# Patient Record
Sex: MALE | HISPANIC OR LATINO | Employment: FULL TIME
[De-identification: names, ages, dates, MRNs, and addresses within clinical notes are randomized per-mention and may not be internally consistent; named-entity substitution may affect disease eponyms.]

---

## 2017-06-21 ENCOUNTER — RECORDS - HEALTHEAST (OUTPATIENT)
Dept: ADMINISTRATIVE | Facility: OTHER | Age: 62
End: 2017-06-21

## 2018-01-02 ENCOUNTER — RECORDS - HEALTHEAST (OUTPATIENT)
Dept: ADMINISTRATIVE | Facility: OTHER | Age: 63
End: 2018-01-02

## 2018-02-20 ENCOUNTER — RECORDS - HEALTHEAST (OUTPATIENT)
Dept: ADMINISTRATIVE | Facility: OTHER | Age: 63
End: 2018-02-20

## 2018-06-06 ENCOUNTER — RECORDS - HEALTHEAST (OUTPATIENT)
Dept: ADMINISTRATIVE | Facility: OTHER | Age: 63
End: 2018-06-06

## 2018-06-06 LAB
ALT SERPL W/O P-5'-P-CCNC: 29 IU/L (ref 8–45)
AST SERPL-CCNC: 25 IU/L (ref 2–40)
CREAT SERPL-MCNC: 0.95 MG/DL (ref 0.72–1.25)
GFR ESTIMATE EXT - HISTORICAL: >60 ML/MIN/1.73M2
GFR ESTIMATE, IF BLACK EXT - HISTORICAL: >60 ML/MIN/1.73M2

## 2018-06-12 ENCOUNTER — RECORDS - HEALTHEAST (OUTPATIENT)
Dept: ADMINISTRATIVE | Facility: OTHER | Age: 63
End: 2018-06-12

## 2019-09-12 ENCOUNTER — RECORDS - HEALTHEAST (OUTPATIENT)
Dept: ADMINISTRATIVE | Facility: OTHER | Age: 64
End: 2019-09-12

## 2019-09-23 ENCOUNTER — RECORDS - HEALTHEAST (OUTPATIENT)
Dept: ADMINISTRATIVE | Facility: OTHER | Age: 64
End: 2019-09-23

## 2019-10-22 ENCOUNTER — RECORDS - HEALTHEAST (OUTPATIENT)
Dept: ADMINISTRATIVE | Facility: OTHER | Age: 64
End: 2019-10-22

## 2019-11-07 ENCOUNTER — RECORDS - HEALTHEAST (OUTPATIENT)
Dept: ADMINISTRATIVE | Facility: OTHER | Age: 64
End: 2019-11-07

## 2020-05-04 ENCOUNTER — OFFICE VISIT - HEALTHEAST (OUTPATIENT)
Dept: FAMILY MEDICINE | Facility: CLINIC | Age: 65
End: 2020-05-04

## 2020-05-04 DIAGNOSIS — R79.89 LOW TESTOSTERONE: ICD-10-CM

## 2020-05-04 RX ORDER — TESTOSTERONE CYPIONATE 200 MG/ML
200 INJECTION, SOLUTION INTRAMUSCULAR
Qty: 5 ML | Refills: 1 | Status: SHIPPED | OUTPATIENT
Start: 2020-05-04

## 2020-05-04 RX ORDER — ERGOCALCIFEROL 1.25 MG/1
50000 CAPSULE ORAL
Status: SHIPPED | COMMUNITY
Start: 2019-08-13

## 2020-05-04 RX ORDER — FERROUS SULFATE 325(65) MG
1 TABLET ORAL
Status: SHIPPED | COMMUNITY
Start: 2020-05-04

## 2020-05-05 ENCOUNTER — COMMUNICATION - HEALTHEAST (OUTPATIENT)
Dept: FAMILY MEDICINE | Facility: CLINIC | Age: 65
End: 2020-05-05

## 2021-04-09 ENCOUNTER — RECORDS - HEALTHEAST (OUTPATIENT)
Dept: HEALTH INFORMATION MANAGEMENT | Facility: CLINIC | Age: 66
End: 2021-04-09

## 2021-04-15 ENCOUNTER — AMBULATORY - HEALTHEAST (OUTPATIENT)
Dept: NURSING | Facility: CLINIC | Age: 66
End: 2021-04-15

## 2021-05-06 ENCOUNTER — AMBULATORY - HEALTHEAST (OUTPATIENT)
Dept: NURSING | Facility: CLINIC | Age: 66
End: 2021-05-06

## 2021-06-07 NOTE — PROGRESS NOTES
"Malachi Husain is a 65 y.o. male who is being evaluated via a billable video visit.      The patient has been notified of following:     \"This video visit will be conducted via a call between you and your physician/provider. We have found that certain health care needs can be provided without the need for an in-person physical exam.  This service lets us provide the care you need with a video conversation.  If a prescription is necessary we can send it directly to your pharmacy.  If lab work is needed we can place an order for that and you can then stop by our lab to have the test done at a later time.    Video visits are billed at different rates depending on your insurance coverage. Please reach out to your insurance provider with any questions.    If during the course of the call the physician/provider feels a video visit is not appropriate, you will not be charged for this service.\"    Patient has given verbal consent to a Video visit? Yes    Patient would like to receive their AVS by AVS Preference: Christopher.    Patient would like the video invitation sent by: Text to cell phone: [phone]     Will anyone else be joining your video visit? No        Video Start Time: 2:42    Additional provider notes:  ASSESSMENT/PLAN:       1. Low testosterone  New patient visit  - syringe with needle (BD LUER-SHANNAN SYRINGE) 3 mL 22 x 1 1/2\" Syrg; Use 1 Syringe As Directed every 14 (fourteen) days.  Dispense: 10 each; Refill: 3  - testosterone cypionate (DEPOTESTOTERONE CYPIONATE) 200 mg/mL injection; Inject 1 mL (200 mg total) into the shoulder, thigh, or buttocks every 14 (fourteen) days.  Dispense: 5 mL; Refill: 1    Renewed the testosterone as well as his syringes with needles.  No use of testosterone every 2 weeks.    In 2017 the patient did have a low free testosterone which was normal when rechecked the following year.  He has not had any blood work done recently.    In 6 months when I see him back blood work should " "include a CBC, ferritin, vitamin D level, PSA, lipids, TSH    Balaji Peguero MD      PROGRESS NOTE   5/4/2020    SUBJECTIVE:  Malachi Husain is a 65 y.o. male  who presents for   Chief Complaint   Patient presents with     Medication Refill     needs a refill for testosterone, and needles      The patient's visit today was prompted by him running out of testosterone including his needles and syringes.  He has been getting his care at [facility] but with insurance change [facility] is out of network.  He had a history of hypogonadism that is treated with testosterone 200 mg IM every 2 weeks.  He has not had medicine to use now for a couple shots that have been missed.  He is noticing some increased fatigue.  His legs feel more weak and tired and he has some paresthesias in his legs.    The patient does take a vitamin D supplement monthly as well as an iron supplement on a daily basis.    The patient works at [employer] and is been very busy spending a lot of time on his feet.    There is no problem list on file for this patient.      Current Outpatient Medications   Medication Sig Dispense Refill     ergocalciferol (ERGOCALCIFEROL) 1,250 mcg (50,000 unit) capsule Take 50,000 Units by mouth.       ferrous sulfate 325 (65 FE) MG tablet Take 1 tablet by mouth daily with breakfast.       needle, disp, 22 G 22 gauge x 1 1/2\" Ndle Use As Directed.       testosterone cypionate (DEPOTESTOTERONE CYPIONATE) 200 mg/mL injection Inject 1 mL (200 mg total) into the shoulder, thigh, or buttocks every 14 (fourteen) days. 5 mL 1     syringe with needle (BD LUER-SHANNAN SYRINGE) 3 mL 22 x 1 1/2\" Syrg Use 1 Syringe As Directed every 14 (fourteen) days. 10 each 3     No current facility-administered medications for this visit.        Social History     Tobacco Use   Smoking Status Current Every Day Smoker     Types: Cigarettes   Smokeless Tobacco Never Used           OBJECTIVE:        No results found for this or any previous visit (from the past " 240 hour(s)).    There were no vitals filed for this visit.  No data recorded    Physical Exam:     GENERAL: healthy, alert and no distress  EYES: Eyes grossly normal to inspection, conjunctivae and sclerae normal  RESP: no audible wheeze, cough, or visible cyanosis.  No visible retractions or increased work of breathing.  Able to speak fully in complete sentences.  NEURO: Cranial nerves grossly intact, mentation intact and speech normal  PSYCH: mentation appears normal, affect normal/bright, judgement and insight intact, normal speech and appearance well-groomed      Video-Visit Details    Type of service:  Video Visit    Video End Time (time video stopped): 2:55  Originating Location (pt. Location): Home    Distant Location (provider location):  [facility]     Platform used for Video Visit: Kenneth Peguero MD

## 2021-06-07 NOTE — TELEPHONE ENCOUNTER
Central PA team  [phone]  Pool: HE PA MED ([id number])          PA has been initiated.       PA form completed and faxed insurance via Cover My Meds     Key:  [id number] - PA Case ID: [id number]     Medication:  Testosterone Cypionate 200MG/ML intramuscular solution    Insurance:  [employer]        Response will be received via fax and may take up to 5-10 business days depending on plan

## 2021-06-07 NOTE — TELEPHONE ENCOUNTER
Prior Authorization Request  Who s requesting:  Pharmacy  Pharmacy Name and Location: [id number]  Medication Name: Testosterone Cypionate 200MG/ML  Insurance Plan: [employer] [facility] plan   Insurance Member ID Number:  [id number]  CoverMyMeds Key: N/A  Informed patient that prior authorizations can take up to 10 business days for response:   Yes  Okay to leave a detailed message: No

## 2021-06-16 PROBLEM — E55.9 VITAMIN D DEFICIENCY: Status: ACTIVE | Noted: 2021-04-10

## 2021-06-16 PROBLEM — E29.1 HYPOGONADISM MALE: Status: ACTIVE | Noted: 2021-04-10

## 2021-06-17 NOTE — TELEPHONE ENCOUNTER
Telephone Encounter by Tracy Acosta at 5/7/2020  8:33 AM     Author: Tracy Acosta Service: -- Author Type: --    Filed: 5/7/2020  8:34 AM Encounter Date: 5/5/2020 Status: Signed    : Tracy Acosta APPROVED:    Approval start date: 05/05/2020  Approval end date:  05/05/2021    Pharmacy has been notified of approval and will contact patient when medication is ready for pickup.

## 2021-07-17 ENCOUNTER — HEALTH MAINTENANCE LETTER (OUTPATIENT)
Age: 66
End: 2021-07-17

## 2021-09-11 ENCOUNTER — HEALTH MAINTENANCE LETTER (OUTPATIENT)
Age: 66
End: 2021-09-11

## 2022-04-23 ENCOUNTER — HEALTH MAINTENANCE LETTER (OUTPATIENT)
Age: 67
End: 2022-04-23

## 2022-07-08 ENCOUNTER — OFFICE VISIT (OUTPATIENT)
Dept: FAMILY MEDICINE | Facility: CLINIC | Age: 67
End: 2022-07-08
Payer: COMMERCIAL

## 2022-07-08 VITALS
TEMPERATURE: 98.2 F | SYSTOLIC BLOOD PRESSURE: 116 MMHG | RESPIRATION RATE: 12 BRPM | DIASTOLIC BLOOD PRESSURE: 60 MMHG | WEIGHT: 143 LBS | HEART RATE: 60 BPM | OXYGEN SATURATION: 98 %

## 2022-07-08 DIAGNOSIS — D17.30 LIPOMA OF SKIN AND SUBCUTANEOUS TISSUE: Primary | ICD-10-CM

## 2022-07-08 DIAGNOSIS — D36.9 DERMOID CYST: ICD-10-CM

## 2022-07-08 PROCEDURE — 99213 OFFICE O/P EST LOW 20 MIN: CPT | Performed by: PHYSICIAN ASSISTANT

## 2022-07-08 ASSESSMENT — ENCOUNTER SYMPTOMS
GASTROINTESTINAL NEGATIVE: 1
ACTIVITY CHANGE: 0
WHEEZING: 0
CHILLS: 0
FEVER: 0
COUGH: 0
PALPITATIONS: 0
FATIGUE: 0

## 2022-07-08 NOTE — PROGRESS NOTES
Assessment & Plan       ICD-10-CM    1. Lipoma of skin and subcutaneous tissue  D17.30    2. Dermoid cyst  D36.9      #1 Lipoma/dermoid cyst- he does have either a lipoma vs cyst to his left upper back that he has had for a few months.  Getting larger.  It is nonpainful.  No evidence of infection today.  Is close to his spine on his left upper back region.  Due to the location and the size we will have him see general surgery for removal.  Is to watch for signs and symptoms of infection.  If he develops any symptoms he is to let us know.  He is to monitor this in the meantime.  General surgery referral has been placed.  Agreed to this plan.  Questions were answered    No follow-ups on file.    [facility]   Malachi is a 67 year oldb, presenting for the following health issues:  Derm Problem (Has a cyst on L side of back x 6-7 months. Bothersome and sore. Continues to get bigger)      Malachi is a pleasant 67-year-old male who presents to the clinic today for evaluation on a lump to his left upper back.  He states he had it for many months.  He states that its not painful.  He does not note any significant drainage.  He has not had any warmness to the area.  No fevers or chills.  Not had any trauma to the area that he is aware of.    History of Present Illness       Reason for visit:  Cyst on back    He eats 2-3 servings of fruits and vegetables daily.He consumes 1 sweetened beverage(s) daily.He exercises with enough effort to increase his heart rate 10 to 19 minutes per day.  He exercises with enough effort to increase his heart rate 3 or less days per week.   He is taking medications regularly.         Review of Systems   Constitutional: Negative for activity change, chills, fatigue and fever.   HENT: Negative.    Respiratory: Negative for cough and wheezing.    Cardiovascular: Negative for chest pain and palpitations.   Gastrointestinal: Negative.     Skin:        Lump to left upper back.  Becoming larger.  Nonpainful.  No drainage            Objective    /60 (BP Location: Left arm, Patient Position: Sitting, Cuff Size: Adult Regular)   Pulse 60   Temp 98.2  F (36.8  C) (Oral)   Resp 12   Wt 64.9 kg (143 lb)   SpO2 98%   There is no height or weight on file to calculate BMI.  Physical Exam  Vitals and nursing note reviewed.   Constitutional:       General: He is not in acute distress.     Appearance: Normal appearance. He is not ill-appearing, toxic-appearing or diaphoretic.   HENT:      Head: Normocephalic and atraumatic.   Eyes:      Conjunctiva/sclera: Conjunctivae normal.   Musculoskeletal:      Cervical back: Neck supple.   Skin:     General: Skin is warm.      Findings: Lesion present. No rash.             Comments: 2 cm x 1.5cm sebaceous cyst/dermoid cyst to left upper back.  No pain with palpation.  Mild erythema but no warmth.  No signs of infection.   Neurological:      General: No focal deficit present.      Mental Status: He is alert.      GCS: GCS eye subscore is 4. GCS verbal subscore is 5. GCS motor subscore is 6.      Gait: Gait is intact.                .  ..

## 2022-10-29 ENCOUNTER — HEALTH MAINTENANCE LETTER (OUTPATIENT)
Age: 67
End: 2022-10-29

## 2023-06-25 ENCOUNTER — HEALTH MAINTENANCE LETTER (OUTPATIENT)
Age: 68
End: 2023-06-25

## 2024-08-18 ENCOUNTER — HEALTH MAINTENANCE LETTER (OUTPATIENT)
Age: 69
End: 2024-08-18